# Patient Record
Sex: MALE | Race: WHITE | NOT HISPANIC OR LATINO | Employment: FULL TIME | URBAN - METROPOLITAN AREA
[De-identification: names, ages, dates, MRNs, and addresses within clinical notes are randomized per-mention and may not be internally consistent; named-entity substitution may affect disease eponyms.]

---

## 2023-04-26 DIAGNOSIS — Z86.59 HISTORY OF OCD (OBSESSIVE COMPULSIVE DISORDER): Primary | ICD-10-CM

## 2023-04-26 RX ORDER — FLUOXETINE HYDROCHLORIDE 40 MG/1
40 CAPSULE ORAL DAILY
COMMUNITY
End: 2023-04-26 | Stop reason: SDUPTHER

## 2023-04-26 RX ORDER — FLUOXETINE HYDROCHLORIDE 40 MG/1
CAPSULE ORAL
Qty: 90 CAPSULE | Refills: 0 | OUTPATIENT
Start: 2023-04-26

## 2023-04-26 RX ORDER — FLUOXETINE HYDROCHLORIDE 40 MG/1
40 CAPSULE ORAL DAILY
Qty: 90 CAPSULE | Refills: 1 | Status: SHIPPED | OUTPATIENT
Start: 2023-04-26 | End: 2024-01-11

## 2023-05-22 ENCOUNTER — OFFICE VISIT (OUTPATIENT)
Dept: PRIMARY CARE | Facility: CLINIC | Age: 38
End: 2023-05-22
Payer: COMMERCIAL

## 2023-05-22 VITALS
HEIGHT: 70 IN | SYSTOLIC BLOOD PRESSURE: 128 MMHG | TEMPERATURE: 97.3 F | RESPIRATION RATE: 18 BRPM | BODY MASS INDEX: 35.07 KG/M2 | DIASTOLIC BLOOD PRESSURE: 76 MMHG | WEIGHT: 245 LBS | OXYGEN SATURATION: 98 % | HEART RATE: 68 BPM

## 2023-05-22 DIAGNOSIS — Z86.59 HISTORY OF OCD (OBSESSIVE COMPULSIVE DISORDER): Primary | ICD-10-CM

## 2023-05-22 DIAGNOSIS — R06.83 HABITUAL SNORING: ICD-10-CM

## 2023-05-22 DIAGNOSIS — G47.19 EXCESSIVE DAYTIME SLEEPINESS: ICD-10-CM

## 2023-05-22 DIAGNOSIS — E66.09 CLASS 2 OBESITY DUE TO EXCESS CALORIES WITHOUT SERIOUS COMORBIDITY WITH BODY MASS INDEX (BMI) OF 35.0 TO 35.9 IN ADULT: ICD-10-CM

## 2023-05-22 DIAGNOSIS — R29.818 SUSPECTED SLEEP APNEA: ICD-10-CM

## 2023-05-22 PROBLEM — E66.812 CLASS 2 OBESITY DUE TO EXCESS CALORIES WITHOUT SERIOUS COMORBIDITY WITH BODY MASS INDEX (BMI) OF 35.0 TO 35.9 IN ADULT: Status: ACTIVE | Noted: 2023-05-22

## 2023-05-22 PROCEDURE — 99214 OFFICE O/P EST MOD 30 MIN: CPT | Performed by: FAMILY MEDICINE

## 2023-05-22 PROCEDURE — 1036F TOBACCO NON-USER: CPT | Performed by: FAMILY MEDICINE

## 2023-05-22 PROCEDURE — 3008F BODY MASS INDEX DOCD: CPT | Performed by: FAMILY MEDICINE

## 2023-05-22 ASSESSMENT — ENCOUNTER SYMPTOMS
SHORTNESS OF BREATH: 0
HEADACHES: 0

## 2023-05-22 NOTE — PROGRESS NOTES
"Subjective     Erasto Bhagat is a 37 y.o. male who presents for Med Refill (Pt. In for medication follow up.).    HPI     Hx of OCD, controlled on prozac daily.      He also has gained weight since last visit.      He also is concerned for GABRIELLE due to his loud snoring and gasping/choking at night, apnea spells, daytime sleepiness.  He is requesting sleep study.      Review of Systems   Respiratory:  Negative for shortness of breath.    Cardiovascular:  Negative for chest pain.   Neurological:  Negative for headaches.       Objective     Vitals:    05/22/23 1310 05/22/23 1339   BP: 128/76    BP Location: Right arm    Patient Position: Sitting    Pulse: 68    Resp: 18    Temp: 36.3 °C (97.3 °F)    TempSrc: Temporal    SpO2: 98%    Weight: 111 kg (245 lb)    Height:  1.778 m (5' 10\")        Current Outpatient Medications   Medication Instructions    FLUoxetine (PROZAC) 40 mg, oral, Daily        Past Surgical History:   Procedure Laterality Date    OTHER SURGICAL HISTORY  01/13/2022    Appendectomy        Social History     Tobacco Use    Smoking status: Never    Smokeless tobacco: Never   Vaping Use    Vaping status: Never Used        No family history on file.     Immunization History   Administered Date(s) Administered    Pfizer Purple Cap SARS-CoV-2 11/26/2021        Physical Exam  Vitals reviewed.   Constitutional:       General: He is not in acute distress.     Appearance: Normal appearance. He is obese. He is not ill-appearing.   HENT:      Head: Normocephalic and atraumatic.      Right Ear: Tympanic membrane, ear canal and external ear normal.      Left Ear: Tympanic membrane, ear canal and external ear normal.      Nose: Nose normal.      Mouth/Throat:      Mouth: Mucous membranes are moist.      Pharynx: No oropharyngeal exudate or posterior oropharyngeal erythema.      Comments: Congested posterior pharynx   Eyes:      Conjunctiva/sclera: Conjunctivae normal.   Neck:      Thyroid: No thyroid mass or " thyromegaly.   Cardiovascular:      Rate and Rhythm: Normal rate and regular rhythm.      Heart sounds: No murmur heard.  Pulmonary:      Effort: No respiratory distress.      Breath sounds: Normal breath sounds. No wheezing, rhonchi or rales.   Abdominal:      General: Abdomen is flat. There is no distension.      Palpations: Abdomen is soft. There is no mass.      Tenderness: There is no abdominal tenderness.   Musculoskeletal:         General: Normal range of motion.   Lymphadenopathy:      Cervical: No cervical adenopathy.   Skin:     General: Skin is warm and dry.      Findings: No lesion or rash.   Neurological:      Mental Status: He is alert and oriented to person, place, and time. Mental status is at baseline.   Psychiatric:         Mood and Affect: Mood normal.         Behavior: Behavior normal.         Problem List Items Addressed This Visit       History of OCD (obsessive compulsive disorder) - Primary    Relevant Orders    Comprehensive Metabolic Panel    Lipid Panel    CBC and Auto Differential    Class 2 obesity due to excess calories without serious comorbidity with body mass index (BMI) of 35.0 to 35.9 in adult    Relevant Orders    Hemoglobin A1C    TSH with reflex to Free T4 if abnormal    Home sleep apnea test (HSAT)     Other Visit Diagnoses       Suspected sleep apnea        Relevant Orders    Home sleep apnea test (HSAT)    Habitual snoring        Relevant Orders    Home sleep apnea test (HSAT)    Excessive daytime sleepiness        Relevant Orders    Home sleep apnea test (HSAT)            Assessment/Plan     Suspected GABRIELLE - sleep study ordered    Obesity - Healthy diet, routine exercise was discussed with patient  , declined nutrition referral.  Will consider GLP 1 in future depending on insurance coverage.    OCD - on prozac, controlled well    Complete labs     Follow up after sleep study or in 1-2 months

## 2023-06-11 ENCOUNTER — TELEPHONE (OUTPATIENT)
Dept: PRIMARY CARE | Facility: CLINIC | Age: 38
End: 2023-06-11
Payer: COMMERCIAL

## 2023-06-11 DIAGNOSIS — G47.33 OBSTRUCTIVE SLEEP APNEA: Primary | ICD-10-CM

## 2023-06-14 DIAGNOSIS — R29.818 SUSPECTED SLEEP APNEA: ICD-10-CM

## 2023-06-14 DIAGNOSIS — E66.09 CLASS 2 OBESITY DUE TO EXCESS CALORIES WITHOUT SERIOUS COMORBIDITY WITH BODY MASS INDEX (BMI) OF 35.0 TO 35.9 IN ADULT: ICD-10-CM

## 2023-06-14 DIAGNOSIS — R06.83 HABITUAL SNORING: ICD-10-CM

## 2023-06-14 DIAGNOSIS — G47.19 EXCESSIVE DAYTIME SLEEPINESS: ICD-10-CM

## 2023-08-09 ENCOUNTER — OFFICE VISIT (OUTPATIENT)
Dept: PRIMARY CARE | Facility: CLINIC | Age: 38
End: 2023-08-09
Payer: COMMERCIAL

## 2023-08-09 VITALS
TEMPERATURE: 98.1 F | DIASTOLIC BLOOD PRESSURE: 92 MMHG | OXYGEN SATURATION: 97 % | BODY MASS INDEX: 35.58 KG/M2 | WEIGHT: 248 LBS | RESPIRATION RATE: 16 BRPM | SYSTOLIC BLOOD PRESSURE: 138 MMHG | HEART RATE: 85 BPM

## 2023-08-09 DIAGNOSIS — H66.001 NON-RECURRENT ACUTE SUPPURATIVE OTITIS MEDIA OF RIGHT EAR WITHOUT SPONTANEOUS RUPTURE OF TYMPANIC MEMBRANE: Primary | ICD-10-CM

## 2023-08-09 PROCEDURE — 99213 OFFICE O/P EST LOW 20 MIN: CPT | Performed by: FAMILY MEDICINE

## 2023-08-09 PROCEDURE — 3008F BODY MASS INDEX DOCD: CPT | Performed by: FAMILY MEDICINE

## 2023-08-09 PROCEDURE — 1036F TOBACCO NON-USER: CPT | Performed by: FAMILY MEDICINE

## 2023-08-09 RX ORDER — OFLOXACIN 3 MG/ML
10 SOLUTION AURICULAR (OTIC) DAILY
Qty: 5 ML | Refills: 0 | Status: SHIPPED | OUTPATIENT
Start: 2023-08-09 | End: 2023-08-16

## 2023-08-09 RX ORDER — AMOXICILLIN AND CLAVULANATE POTASSIUM 875; 125 MG/1; MG/1
1 TABLET, FILM COATED ORAL 2 TIMES DAILY
Qty: 20 TABLET | Refills: 0 | Status: SHIPPED | OUTPATIENT
Start: 2023-08-09 | End: 2023-08-19

## 2023-08-09 NOTE — PROGRESS NOTES
Subjective     Erasto Bhagat is a 38 y.o. male who presents for Earache (Ear drainage on the right side with decreased hearing. Sinus pressure/pain onset one week ago. Sore throat has resolved. ).    Earache   Associated symptoms include ear discharge.        Pt reports right ear pain, ear drainage, with decreased hearing. Hx of ear tubes as child. No hx of perforated TM however pt is concerned he may have ruptured his right TM a few days ago. No fevers or chills.  He has sinus congestion.     Review of Systems   HENT:  Positive for ear discharge and ear pain.        Objective     Vitals:    08/09/23 1518   BP: (!) 138/92   Pulse: 85   Resp: 16   Temp: 36.7 °C (98.1 °F)   SpO2: 97%   Weight: 112 kg (248 lb)        Current Outpatient Medications   Medication Instructions    amoxicillin-pot clavulanate (Augmentin) 875-125 mg tablet 875 mg, oral, 2 times daily    FLUoxetine (PROZAC) 40 mg, oral, Daily    ofloxacin (Floxin) 0.3 % otic solution 10 drops, Right Ear, Daily        Past Surgical History:   Procedure Laterality Date    OTHER SURGICAL HISTORY  01/13/2022    Appendectomy        Social History     Tobacco Use    Smoking status: Never    Smokeless tobacco: Never   Vaping Use    Vaping Use: Never used        No family history on file.     Immunization History   Administered Date(s) Administered    Pfizer Purple Cap SARS-CoV-2 11/26/2021        Physical Exam  Constitutional:       General: He is not in acute distress.     Appearance: He is not toxic-appearing.   HENT:      Head: Normocephalic and atraumatic.      Right Ear: External ear normal. Drainage present. No swelling or tenderness. Tympanic membrane is not perforated.      Left Ear: Tympanic membrane, ear canal and external ear normal. Tympanic membrane is not perforated.      Ears:      Comments: Right TM dull, purulant effusion noted     Nose: No congestion or rhinorrhea.      Mouth/Throat:      Mouth: Mucous membranes are moist.      Pharynx:  Oropharynx is clear. No oropharyngeal exudate or posterior oropharyngeal erythema.   Eyes:      Conjunctiva/sclera: Conjunctivae normal.   Cardiovascular:      Rate and Rhythm: Normal rate and regular rhythm.   Pulmonary:      Effort: Pulmonary effort is normal. No respiratory distress.      Breath sounds: Normal breath sounds. No wheezing, rhonchi or rales.   Lymphadenopathy:      Cervical: No cervical adenopathy.   Skin:     General: Skin is warm and dry.      Findings: No rash.   Neurological:      Mental Status: He is alert and oriented to person, place, and time. Mental status is at baseline.   Psychiatric:         Mood and Affect: Mood normal.         Behavior: Behavior normal.         Problem List Items Addressed This Visit    None  Visit Diagnoses       Non-recurrent acute suppurative otitis media of right ear without spontaneous rupture of tympanic membrane    -  Primary    Relevant Medications    amoxicillin-pot clavulanate (Augmentin) 875-125 mg tablet    ofloxacin (Floxin) 0.3 % otic solution            Assessment/Plan     AOM - right ear - with possible mild otitis externa - augmentin and ofloxacin drops prescribed, Follow up if no improvement or if symptoms worsen.

## 2024-01-05 DIAGNOSIS — Z86.59 HISTORY OF OCD (OBSESSIVE COMPULSIVE DISORDER): ICD-10-CM

## 2024-01-11 RX ORDER — FLUOXETINE HYDROCHLORIDE 40 MG/1
40 CAPSULE ORAL DAILY
Qty: 90 CAPSULE | Refills: 3 | Status: SHIPPED | OUTPATIENT
Start: 2024-01-11

## 2024-05-08 ENCOUNTER — APPOINTMENT (OUTPATIENT)
Dept: SLEEP MEDICINE | Facility: HOSPITAL | Age: 39
End: 2024-05-08
Payer: COMMERCIAL

## 2024-05-09 ENCOUNTER — APPOINTMENT (OUTPATIENT)
Dept: SLEEP MEDICINE | Facility: HOSPITAL | Age: 39
End: 2024-05-09
Payer: COMMERCIAL

## 2024-05-13 ENCOUNTER — OFFICE VISIT (OUTPATIENT)
Dept: SLEEP MEDICINE | Facility: CLINIC | Age: 39
End: 2024-05-13
Payer: COMMERCIAL

## 2024-05-13 DIAGNOSIS — E66.09 CLASS 2 OBESITY DUE TO EXCESS CALORIES WITHOUT SERIOUS COMORBIDITY WITH BODY MASS INDEX (BMI) OF 35.0 TO 35.9 IN ADULT: ICD-10-CM

## 2024-05-13 DIAGNOSIS — G47.33 OBSTRUCTIVE SLEEP APNEA SYNDROME, SEVERE: Primary | ICD-10-CM

## 2024-05-13 PROCEDURE — 3008F BODY MASS INDEX DOCD: CPT | Performed by: PSYCHIATRY & NEUROLOGY

## 2024-05-13 PROCEDURE — 99204 OFFICE O/P NEW MOD 45 MIN: CPT | Performed by: PSYCHIATRY & NEUROLOGY

## 2024-05-13 PROCEDURE — 1036F TOBACCO NON-USER: CPT | Performed by: PSYCHIATRY & NEUROLOGY

## 2024-05-13 ASSESSMENT — PATIENT HEALTH QUESTIONNAIRE - PHQ9
2. FEELING DOWN, DEPRESSED OR HOPELESS: NOT AT ALL
SUM OF ALL RESPONSES TO PHQ9 QUESTIONS 1 AND 2: 0
1. LITTLE INTEREST OR PLEASURE IN DOING THINGS: NOT AT ALL

## 2024-05-13 NOTE — PROGRESS NOTES
" Patient: Erasto Bhagat    [id number]  : 1985 -- AGE 38 y.o.    Provider: Ricki Mejia MD     [facility]   Service Date: 2024              [facility]  New Visit Note      Virtual or Telephone Consent  An interactive audio and video telecommunication system which permits real time communications between the patient (at the originating site) and provider (at the distant site) was utilized to provide this telehealth service.   Verbal consent was requested and obtained from Erasto Bhagat on this date, 24 for a telehealth visit.       The patient's referring provider is: No ref. 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      Per record review, he initially presented to his PCP on 2023 with concern for GABRIELLE due to loud snoring, gasping/choking at night, apneic spells, and daytime sleepiness.  Home sleep study was performed and showed very severe GABRIELLE, see details below.    Today, he states that using CPAP is going \"great\". Using it nightly. No longer has drowsy driving. Has more daytime energy. No snoring with CPAP, though it was a problem before. The snoring started in childhood, is worse when supine, can be loud, and got worse over the years with weight gain.    He is getting his supplies renewed appropriately. Was told he needs a visit with us to avoid getting a bill to fully pay the cost of his CPAP.    CPAP device: AirSense 11 auto CPAP, set up 2023  Mask: airfit P10 nasal pillow 2024  Mask fit: good    NIGHTTIME SYMPTOMS:   Snoring: none with CPAP  Witnessed apnea: never  Nocturnal gasping: never  Nocturnal choking: never  Sleep walking: No  Sleep talking:  No  Dream enactment: No  Morning headaches: No  Morning dry mouth/sore throat: No  Nocturia: prior to CPAP, now infrequent  Sleep " paralysis: No  Hypnagogic/hypnopompic hallucinations: No    DAYTIME SYMPTOMS  Daytime sleepiness: generally no  Fatigue: generally no  Trouble with memory/concentration: No  Feeling sleepy while driving: Denies    RLS symptoms: No     SLEEP HABITS:   Preferred sleep position: lateral  Bedtime: 10 pm, sleep latency within 10-20 minutes  Wake time: 6-7 am  # of nocturnal awakenings: 1-2x/night, short  Napping: sometimes when he can on the weekend, napping is refreshing. Napping is variably refreshing  Total estimated sleep per 24 hrs: 8 hours    PRIOR SLEEP STUDIES:  HST 5/29/2023: Weight 108.9 kg, BMI 34.44.  Study showed very severe GABRIELLE with AHI 3% 95/h with no positional component.  Respiratory events consisted of 605 obstructive apneas, 3 central apneas, and 53 hypopneas.  Baseline SpO2 93.4%, mean 89.6%, romeo 48.4% with 133.8 minutes spent at or below 88% (32% of testing time).  -report and hypnogram reviewed personally by me today.    PRIOR TREATMENTS:  No stimulants or sleep aids.    Patient Active Problem List   Diagnosis    History of OCD (obsessive compulsive disorder)    Class 2 obesity due to excess calories without serious comorbidity with body mass index (BMI) of 35.0 to 35.9 in adult     No past medical history on file.  Past Surgical History:   Procedure Laterality Date    OTHER SURGICAL HISTORY  01/13/2022    Appendectomy     Current Outpatient Medications   Medication Sig Dispense Refill    amoxicillin-pot clavulanate (Augmentin) 875-125 mg tablet TAKE 1 TABLET (875 MG) BY MOUTH 2 TIMES A DAY FOR 10 DAYS. 20 tablet 0    FLUoxetine (PROzac) 40 mg capsule Take 1 capsule by mouth once daily 90 capsule 3    ofloxacin (Floxin) 0.3 % otic solution ADMINISTER 10 DROPS INTO THE RIGHT EAR ONCE DAILY FOR 7 DAYS. 5 mL 0     No current facility-administered medications for this visit.     No Known Allergies    FAMILY HISTORY OF SLEEP DISORDERS: none that he knows of.  Family History   Problem Relation Name Age  "of Onset    Crohn's disease Sister      Hypothyroidism Maternal Grandmother         SOCIAL HISTORY  Employment: physician - maternal fetal medicine  Lives with: wife and 5 year old son  Alcohol: 3 beers twice per week - makes him sleepy and fragments his sleep  Cigarettes: never  Illicits: no  Caffeine: 2-3 cups of coffee/day.     ROS: 12 point ROS is negative for all items/systems    PHYSICAL EXAMINATION:   General: Awake. Alert. Comfortable. No apparent distress.   Speech: Normal  Comprehension: Normal  Mood: Stable  Affect: Appropriate  Eyes:   Eyelids: normal            ENT:         Unable to assess patency of nasal passageways or for presence of nasal septum deviation. Unable to clearly view posterior oropharynx to assess tonsils, uvula, and Deshpande tongue score.   Tongue scalloping is present, tongue is enlarged, retrognathia is not present. Dentition is very good.           Neck:          Circumference: increased in caliber  Cardiac:  unable to assess pulses or cardiac rate/rhythm.  Pul:          Normal respiratory effort   Abd:         obese  Neuro: Alert, well-oriented. Cranial nerves II-XII grossly normal and symmetric. No abnormal movements noted.       CPAP download:  DME: [facility]  Setup date: 2023  Date range: 4/10/2024-2024  Days used: 100%  Days used >4 hours: 100%  Average usage (days used): 8 h 56 min  Settin-15 cm H2O, EPR 3  Pressure: 95th %ile 14.8 cm H2O, median 12.7 cm H2O, max 14.9 cm H2O  Leak: 5.8 L/min (95th %ile)  AHI: 4.5 - HI 0.7, OAI 3.4, YOLANDA 0.3, UAI 0.1      LABS/DIAGNOSTICS:  No results found for: \"HGB\", \"CO2\", \"TSH\", \"FREET4\", \"HGBA1C\", \"FERRITIN\", \"IRON\", \"TIBC\", \"IRONSAT\", \"VITD25\", \"JQRSCGXR35\"     Echo: none  PFTs: none       ASSESSMENT AND PLAN: Mr. Erasto Bhagat is a 38 y.o. male with a history of severe GABRIELLE and obesity who is doing extremely well on CPAP.     #GABRIELLE  -We discussed the risk factors for sleep apnea, pathophysiology of sleep apnea, treatment " options, and potential long-term complications of untreated GABRIELLE, including cardiovascular and metabolic complications.   -CPAP download looks great, but machine sometimes hits maximum programmed pressure setting, so I will open up the range to 5-18 cm H2O  -I will let his DME know that he had today's visit with me.    #obesity  -diet and exercise recommended for weight loss    All of the above was discussed with the patient in detail. He voiced an understanding of the above and was agreeable to proceed further as advised.       FOLLOW UP:  May 12, 2025 at 8:20 AM via video for CPAP follow up

## 2024-12-28 ASSESSMENT — PROMIS GLOBAL HEALTH SCALE
RATE_AVERAGE_FATIGUE: MODERATE
CARRYOUT_SOCIAL_ACTIVITIES: VERY GOOD
RATE_MENTAL_HEALTH: VERY GOOD
CARRYOUT_PHYSICAL_ACTIVITIES: COMPLETELY
RATE_AVERAGE_PAIN: 0
RATE_QUALITY_OF_LIFE: VERY GOOD
RATE_SOCIAL_SATISFACTION: EXCELLENT
RATE_GENERAL_HEALTH: FAIR
EMOTIONAL_PROBLEMS: RARELY
RATE_PHYSICAL_HEALTH: FAIR

## 2025-01-03 ENCOUNTER — LAB (OUTPATIENT)
Dept: LAB | Facility: LAB | Age: 40
End: 2025-01-03
Payer: COMMERCIAL

## 2025-01-03 ENCOUNTER — APPOINTMENT (OUTPATIENT)
Facility: CLINIC | Age: 40
End: 2025-01-03
Payer: COMMERCIAL

## 2025-01-03 VITALS
TEMPERATURE: 97.5 F | SYSTOLIC BLOOD PRESSURE: 156 MMHG | BODY MASS INDEX: 38.37 KG/M2 | OXYGEN SATURATION: 95 % | WEIGHT: 268 LBS | HEIGHT: 70 IN | RESPIRATION RATE: 18 BRPM | HEART RATE: 75 BPM | DIASTOLIC BLOOD PRESSURE: 96 MMHG

## 2025-01-03 DIAGNOSIS — E66.812 CLASS 2 OBESITY DUE TO EXCESS CALORIES WITHOUT SERIOUS COMORBIDITY WITH BODY MASS INDEX (BMI) OF 38.0 TO 38.9 IN ADULT: ICD-10-CM

## 2025-01-03 DIAGNOSIS — E66.09 CLASS 2 OBESITY DUE TO EXCESS CALORIES WITHOUT SERIOUS COMORBIDITY WITH BODY MASS INDEX (BMI) OF 38.0 TO 38.9 IN ADULT: ICD-10-CM

## 2025-01-03 DIAGNOSIS — G47.33 OBSTRUCTIVE SLEEP APNEA SYNDROME, SEVERE: ICD-10-CM

## 2025-01-03 DIAGNOSIS — Z86.59 HISTORY OF OCD (OBSESSIVE COMPULSIVE DISORDER): ICD-10-CM

## 2025-01-03 DIAGNOSIS — Z23 NEED FOR DIPHTHERIA-TETANUS-PERTUSSIS (TDAP) VACCINE: ICD-10-CM

## 2025-01-03 DIAGNOSIS — I10 HYPERTENSION, ESSENTIAL, BENIGN: ICD-10-CM

## 2025-01-03 DIAGNOSIS — Z00.00 HEALTH CARE MAINTENANCE: Primary | ICD-10-CM

## 2025-01-03 DIAGNOSIS — Z00.00 HEALTH CARE MAINTENANCE: ICD-10-CM

## 2025-01-03 LAB
ALBUMIN SERPL BCP-MCNC: 4.6 G/DL (ref 3.4–5)
ALP SERPL-CCNC: 59 U/L (ref 33–120)
ALT SERPL W P-5'-P-CCNC: 59 U/L (ref 10–52)
ANION GAP SERPL CALC-SCNC: 16 MMOL/L (ref 10–20)
AST SERPL W P-5'-P-CCNC: 32 U/L (ref 9–39)
BASOPHILS # BLD AUTO: 0.06 X10*3/UL (ref 0–0.1)
BASOPHILS NFR BLD AUTO: 0.8 %
BILIRUB SERPL-MCNC: 0.4 MG/DL (ref 0–1.2)
BUN SERPL-MCNC: 14 MG/DL (ref 6–23)
CALCIUM SERPL-MCNC: 9.4 MG/DL (ref 8.6–10.6)
CHLORIDE SERPL-SCNC: 102 MMOL/L (ref 98–107)
CO2 SERPL-SCNC: 26 MMOL/L (ref 21–32)
CREAT SERPL-MCNC: 0.97 MG/DL (ref 0.5–1.3)
EGFRCR SERPLBLD CKD-EPI 2021: >90 ML/MIN/1.73M*2
EOSINOPHIL # BLD AUTO: 0.17 X10*3/UL (ref 0–0.7)
EOSINOPHIL NFR BLD AUTO: 2.4 %
ERYTHROCYTE [DISTWIDTH] IN BLOOD BY AUTOMATED COUNT: 12.4 % (ref 11.5–14.5)
EST. AVERAGE GLUCOSE BLD GHB EST-MCNC: 114 MG/DL
GLUCOSE SERPL-MCNC: 96 MG/DL (ref 74–99)
HBA1C MFR BLD: 5.6 %
HCT VFR BLD AUTO: 40.7 % (ref 41–52)
HGB BLD-MCNC: 13.5 G/DL (ref 13.5–17.5)
IMM GRANULOCYTES # BLD AUTO: 0.02 X10*3/UL (ref 0–0.7)
IMM GRANULOCYTES NFR BLD AUTO: 0.3 % (ref 0–0.9)
LYMPHOCYTES # BLD AUTO: 2.69 X10*3/UL (ref 1.2–4.8)
LYMPHOCYTES NFR BLD AUTO: 37.4 %
MCH RBC QN AUTO: 28.4 PG (ref 26–34)
MCHC RBC AUTO-ENTMCNC: 33.2 G/DL (ref 32–36)
MCV RBC AUTO: 86 FL (ref 80–100)
MONOCYTES # BLD AUTO: 0.53 X10*3/UL (ref 0.1–1)
MONOCYTES NFR BLD AUTO: 7.4 %
NEUTROPHILS # BLD AUTO: 3.73 X10*3/UL (ref 1.2–7.7)
NEUTROPHILS NFR BLD AUTO: 51.7 %
NRBC BLD-RTO: 0 /100 WBCS (ref 0–0)
PLATELET # BLD AUTO: 302 X10*3/UL (ref 150–450)
POC APPEARANCE, URINE: CLEAR
POC BILIRUBIN, URINE: NEGATIVE
POC BLOOD, URINE: NEGATIVE
POC COLOR, URINE: YELLOW
POC GLUCOSE, URINE: NEGATIVE MG/DL
POC KETONES, URINE: NEGATIVE MG/DL
POC LEUKOCYTES, URINE: NEGATIVE
POC NITRITE,URINE: NEGATIVE
POC PH, URINE: 6.5 PH
POC PROTEIN, URINE: NEGATIVE MG/DL
POC SPECIFIC GRAVITY, URINE: 1.01
POC UROBILINOGEN, URINE: 0.2 EU/DL
POTASSIUM SERPL-SCNC: 4.2 MMOL/L (ref 3.5–5.3)
PROT SERPL-MCNC: 7.3 G/DL (ref 6.4–8.2)
RBC # BLD AUTO: 4.76 X10*6/UL (ref 4.5–5.9)
SODIUM SERPL-SCNC: 140 MMOL/L (ref 136–145)
TSH SERPL-ACNC: 2.33 MIU/L (ref 0.44–3.98)
WBC # BLD AUTO: 7.2 X10*3/UL (ref 4.4–11.3)

## 2025-01-03 PROCEDURE — 3080F DIAST BP >= 90 MM HG: CPT | Performed by: FAMILY MEDICINE

## 2025-01-03 PROCEDURE — 99395 PREV VISIT EST AGE 18-39: CPT | Performed by: FAMILY MEDICINE

## 2025-01-03 PROCEDURE — 84443 ASSAY THYROID STIM HORMONE: CPT

## 2025-01-03 PROCEDURE — 3008F BODY MASS INDEX DOCD: CPT | Performed by: FAMILY MEDICINE

## 2025-01-03 PROCEDURE — 3077F SYST BP >= 140 MM HG: CPT | Performed by: FAMILY MEDICINE

## 2025-01-03 PROCEDURE — 81002 URINALYSIS NONAUTO W/O SCOPE: CPT | Performed by: FAMILY MEDICINE

## 2025-01-03 PROCEDURE — 90715 TDAP VACCINE 7 YRS/> IM: CPT | Performed by: FAMILY MEDICINE

## 2025-01-03 PROCEDURE — 90471 IMMUNIZATION ADMIN: CPT | Performed by: FAMILY MEDICINE

## 2025-01-03 PROCEDURE — 1036F TOBACCO NON-USER: CPT | Performed by: FAMILY MEDICINE

## 2025-01-03 PROCEDURE — 80053 COMPREHEN METABOLIC PANEL: CPT

## 2025-01-03 PROCEDURE — 85025 COMPLETE CBC W/AUTO DIFF WBC: CPT

## 2025-01-03 PROCEDURE — 83036 HEMOGLOBIN GLYCOSYLATED A1C: CPT

## 2025-01-03 PROCEDURE — 93000 ELECTROCARDIOGRAM COMPLETE: CPT | Performed by: FAMILY MEDICINE

## 2025-01-03 RX ORDER — LOSARTAN POTASSIUM 50 MG/1
50 TABLET ORAL DAILY
Qty: 90 TABLET | Refills: 1 | Status: SHIPPED | OUTPATIENT
Start: 2025-01-03

## 2025-01-03 RX ORDER — FLUOXETINE HYDROCHLORIDE 40 MG/1
40 CAPSULE ORAL DAILY
Qty: 90 CAPSULE | Refills: 3 | Status: SHIPPED | OUTPATIENT
Start: 2025-01-03

## 2025-01-03 ASSESSMENT — ENCOUNTER SYMPTOMS
SHORTNESS OF BREATH: 0
HEADACHES: 0

## 2025-01-03 ASSESSMENT — PATIENT HEALTH QUESTIONNAIRE - PHQ9
1. LITTLE INTEREST OR PLEASURE IN DOING THINGS: NOT AT ALL
2. FEELING DOWN, DEPRESSED OR HOPELESS: NOT AT ALL
SUM OF ALL RESPONSES TO PHQ9 QUESTIONS 1 AND 2: 0

## 2025-01-03 NOTE — ASSESSMENT & PLAN NOTE
On prozac, controlled  Orders:    FLUoxetine (PROzac) 40 mg capsule; Take 1 capsule (40 mg) by mouth once daily.

## 2025-01-03 NOTE — PROGRESS NOTES
"Subjective     Erasto Bhagat is a 39 y.o. male who presents for Annual Exam.    HPI       Pt is here today for annual well exam.     Pt was diagnosed with severe GABRIELLE in June 2023.  He is on CPAP and sleeping well.  He sees sleep specialist.     Hx of OCD, on prozac 40 mg daily.  He feels his mood is controlled well.  Patient denies any suicidal or homicidal ideation or plan.      Pt has hx of elevated blood pressure readings.  His home bp readings are in the 130-140s systolic.  Patient denies chest pain, shortness of breath, dizziness, headaches or vision changes.     He is interested in starting zepbound to treat Obesity.   Hx of GABRIELLE.      Review of Systems   Respiratory:  Negative for shortness of breath.    Cardiovascular:  Negative for chest pain.   Neurological:  Negative for headaches.       Objective     Vitals:    01/03/25 1023 01/03/25 1120   BP: (!) 158/109 (!) 156/96   BP Location: Left arm    Patient Position: Sitting    Pulse: 75    Resp: 18    Temp: 36.4 °C (97.5 °F)    TempSrc: Temporal    SpO2: 95%    Weight: 122 kg (268 lb)    Height: 1.778 m (5' 10\")         Current Outpatient Medications   Medication Instructions    FLUoxetine (PROZAC) 40 mg, oral, Daily    losartan (COZAAR) 50 mg, oral, Daily        No Known Allergies     Past Surgical History:   Procedure Laterality Date    APPENDECTOMY      WISDOM TOOTH EXTRACTION          Social History     Tobacco Use    Smoking status: Never    Smokeless tobacco: Never   Vaping Use    Vaping status: Never Used   Substance Use Topics    Alcohol use: Yes     Alcohol/week: 2.0 standard drinks of alcohol     Types: 2 Standard drinks or equivalent per week    Drug use: Never        Family History   Problem Relation Name Age of Onset    No Known Problems Father      Crohn's disease Sister      Hypothyroidism Maternal Grandmother      Hypertension Maternal Grandmother          Immunization History   Administered Date(s) Administered    COVID-19, mRNA, LNP-S, " PF, 30 mcg/0.3 mL dose 11/26/2021    Flu vaccine (IIV4), preservative free *Check age/dose* 09/11/2022    Tdap vaccine, age 7 year and older (BOOSTRIX, ADACEL) 01/03/2025        Physical Exam  Vitals reviewed.   Constitutional:       General: He is not in acute distress.     Appearance: Normal appearance. He is obese. He is not ill-appearing.   HENT:      Head: Normocephalic and atraumatic.      Right Ear: Tympanic membrane, ear canal and external ear normal.      Left Ear: Tympanic membrane, ear canal and external ear normal.      Mouth/Throat:      Mouth: Mucous membranes are moist.      Pharynx: No oropharyngeal exudate or posterior oropharyngeal erythema.   Neck:      Thyroid: No thyroid mass or thyromegaly.   Cardiovascular:      Rate and Rhythm: Normal rate and regular rhythm.      Heart sounds: No murmur heard.  Pulmonary:      Effort: No respiratory distress.      Breath sounds: Normal breath sounds. No wheezing, rhonchi or rales.   Abdominal:      General: There is no distension.      Palpations: Abdomen is soft.      Tenderness: There is no abdominal tenderness.   Genitourinary:     Comments: Declined testicular exam  Lymphadenopathy:      Cervical: No cervical adenopathy.   Skin:     General: Skin is warm and dry.      Findings: No rash.   Neurological:      Mental Status: He is alert and oriented to person, place, and time. Mental status is at baseline.   Psychiatric:         Mood and Affect: Mood normal.         Behavior: Behavior normal.         Assessment & Plan  Health care maintenance  Well Exam     Vaccines - tdap - given today     Flu vaccine - utd     I recommend yearly flu vaccine and routine COVID vaccinations as indicated     Complete labs    Healthy diet, routine exercise was discussed with patient      Orders:    POCT UA (nonautomated) manually resulted    ECG 12 lead (Clinic Performed)    Comprehensive Metabolic Panel; Future    Lipid Panel; Future    CBC and Auto Differential; Future     Hemoglobin A1C; Future    Class 2 obesity due to excess calories without serious comorbidity with body mass index (BMI) of 38.0 to 38.9 in adult  See clinical medicine to discuss to [facility]   Orders:    Hemoglobin A1C; Future    TSH with reflex to Free T4 if abnormal; Future    Referral to Clinical Pharmacy; Future    History of OCD (obsessive compulsive disorder)  On prozac, controlled  Orders:    FLUoxetine (PROzac) 40 mg capsule; Take 1 capsule (40 mg) by mouth once daily.    Obstructive sleep apnea syndrome, severe  On CPAP, sees sleep medicine   Orders:    Referral to Clinical Pharmacy; Future    Hypertension, essential, benign  Newly diagnosed, start losartan 50 mg daily, The goals of therapy, medication dose, frequency and potential side effects were discussed with patient today.  The patient is agreeable to taking the medication as prescribed.       Patient was instructed to record blood pressures (using an arm BP monitor) at home 1-2 times per day (per AHA guidelines) and to follow up in office for a blood pressure recheck in 4-6 weeks.  I also encouraged low-sodium diet and regular exercise.  I also discussed with patient the importance of good blood pressure control to avoid long-term complications such as heart attack and stroke.      Orders:    TSH with reflex to Free T4 if abnormal; Future    losartan (Cozaar) 50 mg tablet; Take 1 tablet (50 mg) by mouth once daily.    Need for diphtheria-tetanus-pertussis (Tdap) vaccine    Orders:    Tdap vaccine, age 7 years and older

## 2025-01-03 NOTE — ASSESSMENT & PLAN NOTE
Newly diagnosed, start losartan 50 mg daily, The goals of therapy, medication dose, frequency and potential side effects were discussed with patient today.  The patient is agreeable to taking the medication as prescribed.       Patient was instructed to record blood pressures (using an arm BP monitor) at home 1-2 times per day (per AHA guidelines) and to follow up in office for a blood pressure recheck in 4-6 weeks.  I also encouraged low-sodium diet and regular exercise.  I also discussed with patient the importance of good blood pressure control to avoid long-term complications such as heart attack and stroke.      Orders:    TSH with reflex to Free T4 if abnormal; Future    losartan (Cozaar) 50 mg tablet; Take 1 tablet (50 mg) by mouth once daily.     Purse String (Intermediate) Text: Given the location of the defect and the characteristics of the surrounding skin a purse string intermediate closure was deemed most appropriate.  Undermining was performed circumfirentially around the surgical defect.  A purse string suture was then placed and tightened.

## 2025-01-03 NOTE — ASSESSMENT & PLAN NOTE
See clinical medicine to discuss to GLP1   Orders:    Hemoglobin A1C; Future    TSH with reflex to Free T4 if abnormal; Future    Referral to Clinical Pharmacy; Future

## 2025-01-05 DIAGNOSIS — I10 HYPERTENSION, ESSENTIAL, BENIGN: Primary | ICD-10-CM

## 2025-01-05 DIAGNOSIS — R74.01 ELEVATED ALT MEASUREMENT: ICD-10-CM

## 2025-01-10 ENCOUNTER — TELEMEDICINE (OUTPATIENT)
Dept: PHARMACY | Facility: HOSPITAL | Age: 40
End: 2025-01-10
Payer: COMMERCIAL

## 2025-01-10 DIAGNOSIS — E66.09 CLASS 2 OBESITY DUE TO EXCESS CALORIES WITHOUT SERIOUS COMORBIDITY WITH BODY MASS INDEX (BMI) OF 38.0 TO 38.9 IN ADULT: Primary | ICD-10-CM

## 2025-01-10 DIAGNOSIS — G47.33 OBSTRUCTIVE SLEEP APNEA SYNDROME, SEVERE: ICD-10-CM

## 2025-01-10 DIAGNOSIS — E66.812 CLASS 2 OBESITY DUE TO EXCESS CALORIES WITHOUT SERIOUS COMORBIDITY WITH BODY MASS INDEX (BMI) OF 38.0 TO 38.9 IN ADULT: Primary | ICD-10-CM

## 2025-01-10 RX ORDER — TIRZEPATIDE 2.5 MG/.5ML
2.5 INJECTION, SOLUTION SUBCUTANEOUS
Qty: 2 ML | Refills: 1 | Status: SHIPPED | OUTPATIENT
Start: 2025-01-10

## 2025-01-10 NOTE — PROGRESS NOTES
[facility] Pharmacist Visit - Weight Management  Erasto Bhagat is a 39 y.o. male was referred to Clinical Pharmacy Team for Obesity.    Referring Provider: Dejuan Bishop DO  PCP: Dejuan Bishop DO - last visit: 1/3/25, next visit:     Subjective   HPI  PMH significant for HTN.  Special needs/barriers to therapy: none    WEIGHT MANAGEMENT  BMI Readings from Last 1 Encounters:   01/03/25 38.45 kg/m²   Starting weight: 268 lbs (122 kg)  Current weight: 268 lbs    Lab Results   Component Value Date    HGBA1C 5.6 01/03/2025    GLUCOSE 96 01/03/2025   Hyperglycemia: Denies signs and symptoms    Lifestyle  Diet: 3 meals/day.   BK: Egg or two  LN: [place] to eating out  DN: Pizza or ordering out  Snacks: Sometimes at night  Drinks: Coffee morning and afternoon. Diet pepsi occasionally  Has worked with nutritionist/dietician? No    Physical Activity: Rarely, walking only    Other Potential Contributing Factors  Alcohol use: Average 3-4 drinks/week  Medications that may cause weight gain:  Fluoxetine  Mental health: Following with behavioral health specialist  Comorbidities: hypertension and sleep apnea/hypopnea    Non-pharmacological therapy  Weight loss techniques attempted:  Commercial weight loss program: Slim Fast    Pharmacological therapy  Current Medications: none  Previous Medications: none     Adherence: Takes medication as directed and reports no missed doses  Adverse Effects: none    Insurance coverage of weight-loss medications? No, Plan Exclusion  Eligible for copay cards/programs? Yes, Commercial    Medication Patient Risks/Cautions Notes   Wegovy (semaglutide) None    Zepbound (tirzepatide) None    Saxenda (liraglutide) None Current backorder of starting doses   Qsymia (phentermine-topiramate) None Controlled substance   Contrave (bupropion-naltrexone) Uncontrolled HTN    Adipex (phentermine) None Controlled substance,  Short-term use only   Samy/Xenical (orlistat)  Adverse effects likely outweigh  "benefit.     Denies hx of pancreatitis, gastroparesis, thyroid cancer, or Gastric bypass surgery  Denies hx of Seizures or chronic opioid use    Goal: < 200lbs    Secondary Prevention  ASCVD Risk:   The ASCVD Risk score (Chris KNAPP, et al., 2019) failed to calculate for the following reasons:    The 2019 ASCVD risk score is only valid for ages 40 to 79  On Statin?: No,      Immunizations Needed: All up-to-date and documented  Tobacco Use: non-smoker    Objective   Vitals  BP Readings from Last 2 Encounters:   01/03/25 (!) 156/96   08/09/23 (!) 138/92     BMI Readings from Last 1 Encounters:   01/03/25 38.45 kg/m²      Labs  A1C  Lab Results   Component Value Date    HGBA1C 5.6 01/03/2025     BMP  Lab Results   Component Value Date    CALCIUM 9.4 01/03/2025     01/03/2025    K 4.2 01/03/2025    CO2 26 01/03/2025     01/03/2025    BUN 14 01/03/2025    CREATININE 0.97 01/03/2025    EGFR >90 01/03/2025     LFTs  Lab Results   Component Value Date    ALT 59 (H) 01/03/2025    AST 32 01/03/2025    ALKPHOS 59 01/03/2025    BILITOT 0.4 01/03/2025     FLP  No results found for: \"TRIG\", \"CHOL\", \"LDLF\", \"LDLCALC\", \"HDL\"    Assessment/Plan   Problem List Items Addressed This Visit    None      Weight Management: Current regimen includes none. Patient's current weight reported as 268 lbs.  START Zepbound 2.5mg solutions inject 2.5mg subcutaneous once weekly  Prescription for the following were sent to patient's preferred  Pharmacy:  Zepbound 2.5mg solutions inject 2.5mg subcutaneous once weekly  Educated and discuss with patient the benefits and risk of starting GLP-1 for weight loss/DM  Educated patient that to maximize weight loss and prevent return weight gain, patient has to combine GLP-1 with good diet and exercise  DASH Diet  150 mins per week of exercise  Educated patient on the side effects of GLP-1: N/V, stomach upset, diarrhea, stomach pain, etc. Patient instructed to call should there be any questions or " concerns.  FUV with pharmacy in 4 weeks to assess tolerance and titration  Continue to focus on lifestyle modifications as discussed.    Patient Education:  Counseled patient on relevant medication mechanisms of action, expectations, side effects, duration of therapy, contraindications, administration, and monitoring parameters.  All questions and concerns addressed. Contact pharmacist with any further questions or concerns prior to next appointment.  Reviewed dietary recommendations: Healthy Plate method    Clinical Pharmacist follow-up: 2/7, Telehealth visit    Maxime Archuleta PharmD  Clinical Pharmacist  01/10/25    Continue all meds under the continuation of care with the referring provider and clinical pharmacy team.  Verbal consent to manage patient's drug therapy was obtained from the patient. They were informed they may decline to participate or withdraw from participation in pharmacy services at any time.

## 2025-02-06 ENCOUNTER — CLINICAL SUPPORT (OUTPATIENT)
Dept: MATERNAL FETAL MEDICINE | Facility: CLINIC | Age: 40
End: 2025-02-06
Payer: COMMERCIAL

## 2025-02-06 DIAGNOSIS — R74.01 ELEVATED ALT MEASUREMENT: ICD-10-CM

## 2025-02-06 DIAGNOSIS — I10 HYPERTENSION, ESSENTIAL, BENIGN: ICD-10-CM

## 2025-02-06 PROCEDURE — 36415 COLL VENOUS BLD VENIPUNCTURE: CPT

## 2025-02-06 PROCEDURE — 80053 COMPREHEN METABOLIC PANEL: CPT

## 2025-02-07 ENCOUNTER — APPOINTMENT (OUTPATIENT)
Dept: PHARMACY | Facility: HOSPITAL | Age: 40
End: 2025-02-07
Payer: COMMERCIAL

## 2025-02-07 DIAGNOSIS — E66.09 CLASS 2 OBESITY DUE TO EXCESS CALORIES WITHOUT SERIOUS COMORBIDITY WITH BODY MASS INDEX (BMI) OF 38.0 TO 38.9 IN ADULT: Primary | ICD-10-CM

## 2025-02-07 DIAGNOSIS — E66.812 CLASS 2 OBESITY DUE TO EXCESS CALORIES WITHOUT SERIOUS COMORBIDITY WITH BODY MASS INDEX (BMI) OF 38.0 TO 38.9 IN ADULT: Primary | ICD-10-CM

## 2025-02-07 LAB
ALBUMIN SERPL BCP-MCNC: 4.8 G/DL (ref 3.4–5)
ALP SERPL-CCNC: 53 U/L (ref 33–120)
ALT SERPL W P-5'-P-CCNC: 45 U/L (ref 10–52)
ANION GAP SERPL CALC-SCNC: 14 MMOL/L (ref 10–20)
AST SERPL W P-5'-P-CCNC: 29 U/L (ref 9–39)
BILIRUB SERPL-MCNC: 0.4 MG/DL (ref 0–1.2)
BUN SERPL-MCNC: 12 MG/DL (ref 6–23)
CALCIUM SERPL-MCNC: 9.8 MG/DL (ref 8.6–10.6)
CHLORIDE SERPL-SCNC: 100 MMOL/L (ref 98–107)
CO2 SERPL-SCNC: 29 MMOL/L (ref 21–32)
CREAT SERPL-MCNC: 1.18 MG/DL (ref 0.5–1.3)
EGFRCR SERPLBLD CKD-EPI 2021: 80 ML/MIN/1.73M*2
GLUCOSE SERPL-MCNC: 93 MG/DL (ref 74–99)
POTASSIUM SERPL-SCNC: 4.4 MMOL/L (ref 3.5–5.3)
PROT SERPL-MCNC: 7.6 G/DL (ref 6.4–8.2)
SODIUM SERPL-SCNC: 139 MMOL/L (ref 136–145)

## 2025-02-07 RX ORDER — TIRZEPATIDE 5 MG/.5ML
5 INJECTION, SOLUTION SUBCUTANEOUS
Qty: 2 ML | Refills: 2 | Status: SHIPPED | OUTPATIENT
Start: 2025-02-07

## 2025-02-07 NOTE — PROGRESS NOTES
[facility] Pharmacist Visit - Weight Management  Erasto Bhagat is a 39 y.o. male was referred to Clinical Pharmacy Team for Obesity.    Referring Provider: Dejuan Bishop DO  PCP: Dejuan Bishop DO - last visit: 1/14/25, next visit:     Subjective   HPI  PMH significant for HTN.  Special needs/barriers to therapy: none    WEIGHT MANAGEMENT  BMI Readings from Last 1 Encounters:   01/03/25 38.45 kg/m²   Starting weight: 268 lbs (122 kg)  Current weight: 233 lbs    Lab Results   Component Value Date    HGBA1C 5.6 01/03/2025    GLUCOSE 93 02/06/2025   Hyperglycemia: Denies signs and symptoms    Denies n/v, stomach upset, or diarrhea/constipation.    Non-pharmacological therapy  Weight loss techniques attempted:  Commercial weight loss program: Slim Fast     Pharmacological therapy  Current Medications: Zepbound 2.5mg  Previous Medications: none      Adherence: Takes medication as directed and reports no missed doses  Adverse Effects: none     Insurance coverage of weight-loss medications? No, Plan Exclusion  Eligible for copay cards/programs? Yes, Commercial    Medication Patient Risks/Cautions Notes   Wegovy (semaglutide) None    Zepbound (tirzepatide) None    Saxenda (liraglutide) None Current backorder of starting doses   Qsymia (phentermine-topiramate) None Controlled substance   Contrave (bupropion-naltrexone) None    Adipex (phentermine) None Controlled substance,  Short-term use only   Samy/Xenical (orlistat)  Adverse effects likely outweigh benefit.         Secondary Prevention  ASCVD Risk:   The ASCVD Risk score (Chris DK, et al., 2019) failed to calculate for the following reasons:    The 2019 ASCVD risk score is only valid for ages 40 to 79      Objective   Vitals  BP Readings from Last 2 Encounters:   01/03/25 (!) 156/96   08/09/23 (!) 138/92     BMI Readings from Last 1 Encounters:   01/03/25 38.45 kg/m²      Labs  A1C  Lab Results   Component Value Date    HGBA1C 5.6 01/03/2025     BMP  Lab Results  "  Component Value Date    CALCIUM 9.8 02/06/2025     02/06/2025    K 4.4 02/06/2025    CO2 29 02/06/2025     02/06/2025    BUN 12 02/06/2025    CREATININE 1.18 02/06/2025    EGFR 80 02/06/2025     LFTs  Lab Results   Component Value Date    ALT 45 02/06/2025    AST 29 02/06/2025    ALKPHOS 53 02/06/2025    BILITOT 0.4 02/06/2025     FLP  No results found for: \"TRIG\", \"CHOL\", \"LDLF\", \"LDLCALC\", \"HDL\"    Assessment/Plan   Problem List Items Addressed This Visit    None      Weight Management: Current regimen includes Zepbound 2.5mg subcutaneous. Patient's current weight reported as 233 lbs. Has lost 35 lbs (13% of TBW) since starting therapy plan. Due to success, plan to increase dose.  INCREASE dose of Zepbound to 5mg subcutaneous once weekly  Educate on side effects of dose increase  Reinforce patient that to maximize weight loss and prevent return weight gain, patient has to combine GLP-1 with good diet and exercise  DASH Diet  150 mins per week of exercise  FUV in 4 weeks regarding side effects and dose titration  Continue to focus on lifestyle modifications as discussed.    Patient Education:  Counseled patient on relevant medication mechanisms of action, expectations, side effects, duration of therapy, contraindications, administration, and monitoring parameters.  All questions and concerns addressed. Contact pharmacist with any further questions or concerns prior to next appointment.  Reviewed dietary recommendations: Healthy Plate method    Clinical Pharmacist follow-up: 3/7, Telehealth visit    Maxime Archuleta PharmD  Clinical Pharmacist  02/07/25    Continue all meds under the continuation of care with the referring provider and clinical pharmacy team.  Verbal consent to manage patient's drug therapy was obtained from the patient. They were informed they may decline to participate or withdraw from participation in pharmacy services at any time.  "

## 2025-02-20 ASSESSMENT — ENCOUNTER SYMPTOMS
BLURRED VISION: 0
PALPITATIONS: 0
SWEATS: 0
HEADACHES: 0
ORTHOPNEA: 0
PND: 0
HYPERTENSION: 1
NECK PAIN: 0
SHORTNESS OF BREATH: 0

## 2025-02-21 ENCOUNTER — APPOINTMENT (OUTPATIENT)
Facility: CLINIC | Age: 40
End: 2025-02-21
Payer: COMMERCIAL

## 2025-02-21 VITALS
OXYGEN SATURATION: 97 % | TEMPERATURE: 97.5 F | SYSTOLIC BLOOD PRESSURE: 110 MMHG | WEIGHT: 235 LBS | HEIGHT: 70 IN | HEART RATE: 67 BPM | RESPIRATION RATE: 18 BRPM | BODY MASS INDEX: 33.64 KG/M2 | DIASTOLIC BLOOD PRESSURE: 75 MMHG

## 2025-02-21 DIAGNOSIS — G47.33 OBSTRUCTIVE SLEEP APNEA SYNDROME, SEVERE: ICD-10-CM

## 2025-02-21 DIAGNOSIS — E66.09 CLASS 1 OBESITY DUE TO EXCESS CALORIES WITHOUT SERIOUS COMORBIDITY WITH BODY MASS INDEX (BMI) OF 33.0 TO 33.9 IN ADULT: Primary | ICD-10-CM

## 2025-02-21 DIAGNOSIS — E66.811 CLASS 1 OBESITY DUE TO EXCESS CALORIES WITHOUT SERIOUS COMORBIDITY WITH BODY MASS INDEX (BMI) OF 33.0 TO 33.9 IN ADULT: Primary | ICD-10-CM

## 2025-02-21 DIAGNOSIS — Z86.59 HISTORY OF OCD (OBSESSIVE COMPULSIVE DISORDER): ICD-10-CM

## 2025-02-21 DIAGNOSIS — I10 HYPERTENSION, ESSENTIAL, BENIGN: ICD-10-CM

## 2025-02-21 PROCEDURE — 99214 OFFICE O/P EST MOD 30 MIN: CPT | Performed by: FAMILY MEDICINE

## 2025-02-21 PROCEDURE — 3074F SYST BP LT 130 MM HG: CPT | Performed by: FAMILY MEDICINE

## 2025-02-21 PROCEDURE — 3078F DIAST BP <80 MM HG: CPT | Performed by: FAMILY MEDICINE

## 2025-02-21 PROCEDURE — 3008F BODY MASS INDEX DOCD: CPT | Performed by: FAMILY MEDICINE

## 2025-02-21 PROCEDURE — 1036F TOBACCO NON-USER: CPT | Performed by: FAMILY MEDICINE

## 2025-02-21 ASSESSMENT — ENCOUNTER SYMPTOMS
ORTHOPNEA: 0
HEADACHES: 0
HYPERTENSION: 1
SHORTNESS OF BREATH: 0
BLURRED VISION: 0
SWEATS: 0
PALPITATIONS: 0
PND: 0
NECK PAIN: 0

## 2025-02-21 NOTE — ASSESSMENT & PLAN NOTE
Pt on zepbound 5 mg weekly.  He sees clinical pharmacy.  Doing very well.  Losing weight.  Lost approximately 30lbs over last 4-6 weeks.    Lipids are due.  Pt aware  Try to exercise more.     Orders:    Follow Up In Primary Care - Established; Future

## 2025-02-21 NOTE — PROGRESS NOTES
"Subjective     Erasto Bhagat is a 39 y.o. male who presents for Hypertension.    Hypertension  This is a new problem. The current episode started more than 1 month ago. The problem has been rapidly improving since onset. The problem is controlled. Pertinent negatives include no anxiety, blurred vision, chest pain, headaches, malaise/fatigue, neck pain, orthopnea, palpitations, peripheral edema, PND, shortness of breath or sweats. There are no associated agents to hypertension. Risk factors for coronary artery disease include obesity. Compliance problems include exercise.       Pt is here for HTN follow up.     He is on losartan 50 mg daily.  He noticed that once he lost weight his blood pressures are in much better range in the 110-120s systolic.      He also sees  clinical pharmacy for weight loss management.  He is on zepbound 5 mg and is tolerating it well.  No side effects to his zepbound.  He lost approximately 30 lbs over the last six weeks intentionally.  He is eating much less and much healthier.  He does admit that he needs to start exercising more.      His goal weight in the next 3-6 months is < 200 lbs.      Pt reports fevers, mild cough - overall feeling better.  Declines testing for flu or covid.    Review of Systems   Constitutional:  Negative for malaise/fatigue.   Eyes:  Negative for blurred vision.   Respiratory:  Negative for shortness of breath.    Cardiovascular:  Negative for chest pain, palpitations, orthopnea and PND.   Musculoskeletal:  Negative for neck pain.   Neurological:  Negative for headaches.       Objective     Vitals:    02/21/25 1105   BP: 110/75   BP Location: Right arm   Patient Position: Sitting   Pulse: 67   Resp: 18   Temp: 36.4 °C (97.5 °F)   TempSrc: Temporal   SpO2: 97%   Weight: 107 kg (235 lb)   Height: 1.778 m (5' 10\")        Current Outpatient Medications   Medication Instructions    FLUoxetine (PROZAC) 40 mg, oral, Daily    losartan (COZAAR) 50 mg, oral, Daily "    Zepbound 5 mg, subcutaneous, Every 7 days        No Known Allergies     Past Surgical History:   Procedure Laterality Date    APPENDECTOMY      WISDOM TOOTH EXTRACTION          Social History     Tobacco Use    Smoking status: Never    Smokeless tobacco: Never   Vaping Use    Vaping status: Never Used   Substance Use Topics    Alcohol use: Yes     Alcohol/week: 2.0 standard drinks of alcohol     Types: 2 Standard drinks or equivalent per week    Drug use: Never        Family History   Problem Relation Name Age of Onset    No Known Problems Father      Crohn's disease Sister      Hypothyroidism Maternal Grandmother Cheryl     Hypertension Maternal Grandmother Cheryl     Breast cancer Maternal Grandmother Cheryl         Immunization History   Administered Date(s) Administered    COVID-19, mRNA, LNP-S, PF, 30 mcg/0.3 mL dose 11/26/2021    Flu vaccine (IIV4), preservative free *Check age/dose* 09/11/2022    Tdap vaccine, age 7 year and older (BOOSTRIX, ADACEL) 01/03/2025        Physical Exam  Vitals reviewed.   Constitutional:       General: He is not in acute distress.     Appearance: Normal appearance. He is well-developed. He is obese.   HENT:      Head: Normocephalic and atraumatic.   Eyes:      General: Lids are normal.      Conjunctiva/sclera:      Right eye: Right conjunctiva is not injected.      Left eye: Left conjunctiva is not injected.   Cardiovascular:      Rate and Rhythm: Normal rate and regular rhythm.      Heart sounds: No murmur heard.  Pulmonary:      Effort: Pulmonary effort is normal. No respiratory distress.      Breath sounds: Normal breath sounds. No wheezing, rhonchi or rales.   Skin:     General: Skin is warm and dry.      Findings: No rash.   Neurological:      Mental Status: He is alert and oriented to person, place, and time. Mental status is at baseline.   Psychiatric:         Mood and Affect: Mood normal.         Behavior: Behavior normal.         Assessment & Plan  Class 1 obesity due to  excess calories without serious comorbidity with body mass index (BMI) of 33.0 to 33.9 in adult  Pt on zepbound 5 mg weekly.  He sees clinical pharmacy.  Doing very well.  Losing weight.  Lost approximately 30lbs over last 4-6 weeks.    Lipids are due.  Pt aware  Try to exercise more.     Orders:    Follow Up In Primary Care - Established; Future    Hypertension, essential, benign  Controlled   Orders:    Follow Up In Primary Care - Established; Future    Obstructive sleep apnea syndrome, severe  Pt uses cpap nightly , sleeping well.        History of OCD (obsessive compulsive disorder)  Prozac 40 mg  - controlled

## 2025-03-07 ENCOUNTER — APPOINTMENT (OUTPATIENT)
Dept: PHARMACY | Facility: HOSPITAL | Age: 40
End: 2025-03-07
Payer: COMMERCIAL

## 2025-03-07 DIAGNOSIS — E66.09 CLASS 2 OBESITY DUE TO EXCESS CALORIES WITHOUT SERIOUS COMORBIDITY WITH BODY MASS INDEX (BMI) OF 38.0 TO 38.9 IN ADULT: Primary | ICD-10-CM

## 2025-03-07 DIAGNOSIS — E66.812 CLASS 2 OBESITY DUE TO EXCESS CALORIES WITHOUT SERIOUS COMORBIDITY WITH BODY MASS INDEX (BMI) OF 38.0 TO 38.9 IN ADULT: Primary | ICD-10-CM

## 2025-03-07 NOTE — PROGRESS NOTES
[facility] Pharmacist Visit - Weight Management  Erasto Bhagat is a 39 y.o. male was referred to Clinical Pharmacy Team for Obesity.    Referring Provider: Dejuan Bishop DO  PCP: Dejuan Bishop DO - last visit: 1/14/25, next visit:     Subjective   HPI  PMH significant for HTN.  Special needs/barriers to therapy: none    WEIGHT MANAGEMENT  BMI Readings from Last 1 Encounters:   02/21/25 33.72 kg/m²   Starting weight: 268 lbs (122kg)  Current weight: 219 lbs    Lab Results   Component Value Date    HGBA1C 5.6 01/03/2025    GLUCOSE 93 02/06/2025   Hyperglycemia: Denies signs and symptoms    Denies any side effects with dose increase.    Non-pharmacological therapy  Weight loss techniques attempted:  Commercial weight loss program: slim fast    Pharmacological therapy  Current Medications: Zepbound 5mg  Previous Medications: none     Adherence: Takes medication as directed and reports no missed doses  Adverse Effects: none    Insurance coverage of weight-loss medications? No,    Eligible for copay cards/programs? Yes,      Medication Patient Risks/Cautions Notes   Wegovy (semaglutide) None    Zepbound (tirzepatide) None    Saxenda (liraglutide) None Current backorder of starting doses   Qsymia (phentermine-topiramate) None Controlled substance   Contrave (bupropion-naltrexone) None    Adipex (phentermine) None Controlled substance,  Short-term use only   Samy/Xenical (orlistat)  Adverse effects likely outweigh benefit.         Secondary Prevention  ASCVD Risk:   The ASCVD Risk score (Chris DK, et al., 2019) failed to calculate for the following reasons:    The 2019 ASCVD risk score is only valid for ages 40 to 79    Objective   Vitals  BP Readings from Last 2 Encounters:   02/21/25 110/75   01/03/25 (!) 156/96     BMI Readings from Last 1 Encounters:   02/21/25 33.72 kg/m²      Labs  A1C  Lab Results   Component Value Date    HGBA1C 5.6 01/03/2025     BMP  Lab Results   Component Value Date    CALCIUM 9.8  "02/06/2025     02/06/2025    K 4.4 02/06/2025    CO2 29 02/06/2025     02/06/2025    BUN 12 02/06/2025    CREATININE 1.18 02/06/2025    EGFR 80 02/06/2025     LFTs  Lab Results   Component Value Date    ALT 45 02/06/2025    AST 29 02/06/2025    ALKPHOS 53 02/06/2025    BILITOT 0.4 02/06/2025     FLP  No results found for: \"TRIG\", \"CHOL\", \"LDLF\", \"LDLCALC\", \"HDL\"    Assessment/Plan   Problem List Items Addressed This Visit    None      Weight Management: Current regimen includes Zepbound 5mg subcutaneous. Patient's current weight reported as 219 lbs. Has lost 49 lbs (18.3% of TBW) since starting therapy plan. Due to success, plan to increase dose.  INCREASE dose of Zepbound to 7.5mg subcutaneous once weekly  Educate on side effects of dose increase  Reinforce patient that to maximize weight loss and prevent return weight gain, patient has to combine GLP-1 with good diet and exercise  DASH Diet  150 mins per week of exercise  FUV in 4 weeks regarding side effects and dose titration  Continue to focus on lifestyle modifications as discussed.    Patient Education:  Counseled patient on relevant medication mechanisms of action, expectations, side effects, duration of therapy, contraindications, administration, and monitoring parameters.  All questions and concerns addressed. Contact pharmacist with any further questions or concerns prior to next appointment.  Reviewed dietary recommendations: Healthy Plate method    Clinical Pharmacist follow-up: 4/4, Telehealth visit    Maxime Archuleta PharmD  Clinical Pharmacist  03/07/25    Continue all meds under the continuation of care with the referring provider and clinical pharmacy team.  Verbal consent to manage patient's drug therapy was obtained from the patient. They were informed they may decline to participate or withdraw from participation in pharmacy services at any time.  "

## 2025-04-04 ENCOUNTER — APPOINTMENT (OUTPATIENT)
Dept: PHARMACY | Facility: HOSPITAL | Age: 40
End: 2025-04-04
Payer: COMMERCIAL

## 2025-04-04 DIAGNOSIS — E66.812 CLASS 2 OBESITY DUE TO EXCESS CALORIES WITHOUT SERIOUS COMORBIDITY WITH BODY MASS INDEX (BMI) OF 38.0 TO 38.9 IN ADULT: Primary | ICD-10-CM

## 2025-04-04 DIAGNOSIS — E66.09 CLASS 2 OBESITY DUE TO EXCESS CALORIES WITHOUT SERIOUS COMORBIDITY WITH BODY MASS INDEX (BMI) OF 38.0 TO 38.9 IN ADULT: Primary | ICD-10-CM

## 2025-04-04 NOTE — PROGRESS NOTES
[facility] Pharmacist Visit - Weight Management  Erasto Bhagat is a 39 y.o. male was referred to Clinical Pharmacy Team for Obesity.    Referring Provider: Dejuan Bishop DO  PCP: Dejuan Bishop DO - last visit: 1/14/25, next visit: -    Subjective   HPI  PMH significant for HTN.  Special needs/barriers to therapy: none    WEIGHT MANAGEMENT  BMI Readings from Last 1 Encounters:   02/21/25 33.72 kg/m²   Starting weight: 268 lbs (122 kg)  Current weight: 208 lbs     Lab Results   Component Value Date    HGBA1C 5.6 01/03/2025    GLUCOSE 93 02/06/2025   Hyperglycemia: Denies signs and symptoms    Denies    Non-pharmacological therapy  Weight loss techniques attempted:  Commercial weight loss program: slim fast     Pharmacological therapy  Current Medications: Zepbound 7.5mg  Previous Medications: none      Adherence: Takes medication as directed and reports no missed doses  Adverse Effects: none     Insurance coverage of weight-loss medications? No,    Eligible for copay cards/programs? Yes,      Medication Patient Risks/Cautions Notes   Wegovy (semaglutide) None    Zepbound (tirzepatide) None    Saxenda (liraglutide) None Current backorder of starting doses   Qsymia (phentermine-topiramate) None Controlled substance   Contrave (bupropion-naltrexone) None    Adipex (phentermine) None Controlled substance,  Short-term use only   Samy/Xenical (orlistat)  Adverse effects likely outweigh benefit.         Secondary Prevention  ASCVD Risk:   The ASCVD Risk score (Chris DK, et al., 2019) failed to calculate for the following reasons:    The 2019 ASCVD risk score is only valid for ages 40 to 79      Objective   Vitals  BP Readings from Last 2 Encounters:   02/21/25 110/75   01/03/25 (!) 156/96     BMI Readings from Last 1 Encounters:   02/21/25 33.72 kg/m²      Labs  A1C  Lab Results   Component Value Date    HGBA1C 5.6 01/03/2025     BMP  Lab Results   Component Value Date    CALCIUM 9.8 02/06/2025     02/06/2025     "K 4.4 02/06/2025    CO2 29 02/06/2025     02/06/2025    BUN 12 02/06/2025    CREATININE 1.18 02/06/2025    EGFR 80 02/06/2025     LFTs  Lab Results   Component Value Date    ALT 45 02/06/2025    AST 29 02/06/2025    ALKPHOS 53 02/06/2025    BILITOT 0.4 02/06/2025     FLP  No results found for: \"TRIG\", \"CHOL\", \"LDLF\", \"LDLCALC\", \"HDL\"    Assessment/Plan   Problem List Items Addressed This Visit    None      Weight Management: Current regimen includes Zepbound 75mg subcutaneous. Patient's current weight reported as 208 lbs. Has lost 60 lbs (22% of TBW) since starting therapy plan. Due to success, plan to increase dose.  INCREASE dose of Zepbound to 10mg subcutaneous once weekly  Educate on side effects of dose increase  Reinforce patient that to maximize weight loss and prevent return weight gain, patient has to combine GLP-1 with good diet and exercise  DASH Diet  150 mins per week of exercise  FUV in 4 weeks regarding side effects and dose titration  Continue to focus on lifestyle modifications as discussed.    Patient Education:  Counseled patient on relevant medication mechanisms of action, expectations, side effects, duration of therapy, contraindications, administration, and monitoring parameters.  All questions and concerns addressed. Contact pharmacist with any further questions or concerns prior to next appointment.  Reviewed dietary recommendations: Healthy Plate method    Clinical Pharmacist follow-up: 4/30, Telehealth visit    Maxime Archuleta PharmD  Clinical Pharmacist  04/04/25    Continue all meds under the continuation of care with the referring provider and clinical pharmacy team.  Verbal consent to manage patient's drug therapy was obtained from the patient. They were informed they may decline to participate or withdraw from participation in pharmacy services at any time.  "

## 2025-04-30 ENCOUNTER — APPOINTMENT (OUTPATIENT)
Dept: PHARMACY | Facility: HOSPITAL | Age: 40
End: 2025-04-30
Payer: COMMERCIAL

## 2025-04-30 DIAGNOSIS — G47.33 OBSTRUCTIVE SLEEP APNEA SYNDROME, SEVERE: ICD-10-CM

## 2025-04-30 DIAGNOSIS — E66.812 CLASS 2 OBESITY DUE TO EXCESS CALORIES WITHOUT SERIOUS COMORBIDITY WITH BODY MASS INDEX (BMI) OF 38.0 TO 38.9 IN ADULT: Primary | ICD-10-CM

## 2025-04-30 DIAGNOSIS — E66.09 CLASS 2 OBESITY DUE TO EXCESS CALORIES WITHOUT SERIOUS COMORBIDITY WITH BODY MASS INDEX (BMI) OF 38.0 TO 38.9 IN ADULT: Primary | ICD-10-CM

## 2025-04-30 NOTE — PROGRESS NOTES
[facility] Pharmacist Visit - Weight Management  Erasto Bhagat is a 39 y.o. male was referred to Clinical Pharmacy Team for Obesity.    Referring Provider: Dejuan Bishop DO  PCP: Dejuan Bishop DO - last visit: 1/14/25, next visit: -    Subjective   HPI  PMH significant for HTN.  Special needs/barriers to therapy: none    WEIGHT MANAGEMENT  BMI Readings from Last 1 Encounters:   02/21/25 33.72 kg/m²   Starting weight: 268 lbs (122 kg)  Current weight: 196 lbs    Lab Results   Component Value Date    HGBA1C 5.6 01/03/2025    GLUCOSE 93 02/06/2025   Hyperglycemia: Denies signs and symptoms    Denies any side effects with dose increased    Non-pharmacological therapy  Weight loss techniques attempted:  Commercial weight loss program: slim fast     Pharmacological therapy  Current Medications: Zepbound 7.5mg  Previous Medications: none      Adherence: Takes medication as directed and reports no missed doses  Adverse Effects: none     Insurance coverage of weight-loss medications? No,    Eligible for copay cards/programs? Yes,      Medication Patient Risks/Cautions Notes   Wegovy (semaglutide) None    Zepbound (tirzepatide) None    Saxenda (liraglutide) None Current backorder of starting doses   Qsymia (phentermine-topiramate) None Controlled substance   Contrave (bupropion-naltrexone) None    Adipex (phentermine) None Controlled substance,  Short-term use only   Samy/Xenical (orlistat)  Adverse effects likely outweigh benefit.         Secondary Prevention  ASCVD Risk:   The ASCVD Risk score (Chris DK, et al., 2019) failed to calculate for the following reasons:    The 2019 ASCVD risk score is only valid for ages 40 to 79    Objective   Vitals  BP Readings from Last 2 Encounters:   02/21/25 110/75   01/03/25 (!) 156/96     BMI Readings from Last 1 Encounters:   02/21/25 33.72 kg/m²      Labs  A1C  Lab Results   Component Value Date    HGBA1C 5.6 01/03/2025     BMP  Lab Results   Component Value Date    CALCIUM 9.8  "02/06/2025     02/06/2025    K 4.4 02/06/2025    CO2 29 02/06/2025     02/06/2025    BUN 12 02/06/2025    CREATININE 1.18 02/06/2025    EGFR 80 02/06/2025     LFTs  Lab Results   Component Value Date    ALT 45 02/06/2025    AST 29 02/06/2025    ALKPHOS 53 02/06/2025    BILITOT 0.4 02/06/2025     FLP  No results found for: \"TRIG\", \"CHOL\", \"LDLF\", \"LDLCALC\", \"HDL\"    Assessment/Plan   Problem List Items Addressed This Visit       Obstructive sleep apnea syndrome, severe    Relevant Orders    Referral to Clinical Pharmacy     Other Visit Diagnoses         Class 2 obesity due to excess calories without serious comorbidity with body mass index (BMI) of 38.0 to 38.9 in adult    -  Primary    Relevant Orders    Referral to Clinical Pharmacy            Weight Management:  Current regimen includes Zepbound 10mg subcutaneous. Patient's current weight reported as 196 lbs. Has lost 72 lbs (27% of TBW) since starting therapy plan. Due to success, plan to continue.  Continue taking Zepbound 10mg subcutaneous once weekly  Continue to focus on lifestyle modifications as discussed.    Patient Education:  Counseled patient on relevant medication mechanisms of action, expectations, side effects, duration of therapy, contraindications, administration, and monitoring parameters.  All questions and concerns addressed. Contact pharmacist with any further questions or concerns prior to next appointment.  Reviewed dietary recommendations: Healthy Plate method    Clinical Pharmacist follow-up: 6/11, Telehealth visit    Maxime Archuleta PharmD  Clinical Pharmacist  04/30/25    Continue all meds under the continuation of care with the referring provider and clinical pharmacy team.  Verbal consent to manage patient's drug therapy was obtained from the patient. They were informed they may decline to participate or withdraw from participation in pharmacy services at any time.  "

## 2025-05-12 ENCOUNTER — APPOINTMENT (OUTPATIENT)
Dept: SLEEP MEDICINE | Facility: CLINIC | Age: 40
End: 2025-05-12
Payer: COMMERCIAL

## 2025-05-12 DIAGNOSIS — E66.3 OVERWEIGHT: ICD-10-CM

## 2025-05-12 DIAGNOSIS — G47.33 OSA (OBSTRUCTIVE SLEEP APNEA): Primary | ICD-10-CM

## 2025-05-12 PROCEDURE — 1036F TOBACCO NON-USER: CPT | Performed by: PSYCHIATRY & NEUROLOGY

## 2025-05-12 PROCEDURE — 99212 OFFICE O/P EST SF 10 MIN: CPT | Performed by: PSYCHIATRY & NEUROLOGY

## 2025-05-12 RX ORDER — OMEPRAZOLE 20 MG/1
20 TABLET, DELAYED RELEASE ORAL
COMMUNITY

## 2025-05-12 NOTE — PROGRESS NOTES
Patient: Erasto Bhagat    [id number]  : 1985 -- AGE 39 y.o.    Provider: Ricki Mejia MD     [facility]   Service Date: 2025              [facility]  Follow-up Note      Virtual or Telephone Consent  An interactive audio and video telecommunication system which permits real time communications between the patient (at the originating site) and provider (at the distant site) was utilized to provide this telehealth service.   Verbal consent was requested and obtained from Erasto Bhagat on this date, 25 for a telehealth visit and the patient's location was confirmed at the time of the visit.  I verified the patient's identity and physical location in [place].  If this is a new patient to me, I informed the patient of my name and type of active [place] license that I hold.      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 Per record review, he initially presented to his PCP on 2023 with concern for GABRIELLE due to loud snoring, gasping/choking at night, apneic spells, and daytime sleepiness. He is here today for annual follow up visit. He was last seen by me on 24.    Going well using CPAP. No issues sleeping with it.     Lost a lot of weight, has been on Zepbound since 2025. Went from ~260 lbs to 195 lbs now. Paying out of pocket directly from [employer], costing him ~$500/month.    No longer needing antihypertensive medication.    Had snoring even when he was thinner. Happy to continue using CPAP.    With CPAP, no known snoring, no dry mouth, no daytime sleepiness. No pressure intolerance.    Sleeping 7-8 hours/night.    PAP DEVICE   DME: [facility]  Setup date: 23  Type: AirSense 11 autoCPAP    Patient is keeping the equipment clean and supplies are being renewed at appropriate intervals.     Prior Sleep studies:   HST 2023: Weight 108.9 kg, BMI  34.44.  Study showed very severe GABRIELLE with AHI3% 95/h with no positional component.  Respiratory events consisted of 605 obstructive apneas, 3 central apneas, and 53 hypopneas.  Baseline SpO2 93.4%, mean 89.6%, romeo 48.4% with 133.8 minutes spent at or below 88% (32% of testing time).               REVIEW OF MACHINE DOWNLOAD:   Date Range: 25-25  % Days used: 100%  % Days with Usage >= 4 hrs: 100%  Average usage days used: 8 h 40 min   Settin-18 cmH2O, EPR 3  95th percentile pressure: 11.2 cmH2O, median pressure 7.8 cmH2O, max pressure 12.5 cmH2O  95th percentile leak: 6 LPM, median leak 0 LPM  Residual AHI: 3.9  HI: 0.2  OAI: 3.2  YOLANDA: 0.5  UI: 0.0    Last 365 days:    Problem List[1]  Medical History[2]  Surgical History[3]  Medications Ordered Prior to Encounter[4]    PHYSICAL EXAMINATION:   General: Awake. Alert. Comfortable. No apparent distress.   Speech: Normal.  Comprehension: Normal.  Mood: Stable.  Affect: Appropriate.  Pul:         Normal respiratory effort.   Abd:        Increased central adiposity  Neuro: Alert, well-oriented. Cranial nerves II-XII grossly normal and symmetric.  No abnormal movements noted.       ASSESSMENT AND PLAN: Mr. Erasto Bhagat is a 39 y.o. male with history of severe GABRIELLE treating with GABRIELLE. Patient's sleep apnea is under very good control with CPAP, he is deriving significant subjective benefit from treatment, his compliance is excellent, and mask leak is well controlled overall. He has lost a considerable amount of weight in the last several months since being on tirzepatide (went from ~260 lbs to now 195 lbs). His CPAP pressure requirement is lower, but suggests that he still has GABRIELLE. We discussed continuing CPAP, as he has significant snoring even when he was thinner in years past.      #GABRIELLE  -continue current CPAP settings; continue nightly usage    #overweight - significantly improving with tirzepatide. BMI is now ~28  -tirzepatide managed through  PCP    All of the above was discussed with the patient in detail. He voiced an understanding of the above and was agreeable to proceed further as advised.     18 minutes were spent with the patient plus time spent reviewing the chart, updating the chart as needed, and documenting.     FOLLOW UP: May 11, 2026 at 8 AM via video for annual CPAP follow up         [1]   Patient Active Problem List  Diagnosis    History of OCD (obsessive compulsive disorder)    Class 1 obesity due to excess calories without serious comorbidity with body mass index (BMI) of 33.0 to 33.9 in adult    Obstructive sleep apnea syndrome, severe    Hypertension, essential, benign   [2] History reviewed. No pertinent past medical history.  [3]   Past Surgical History:  Procedure Laterality Date    APPENDECTOMY      WISDOM TOOTH EXTRACTION     [4]   Current Outpatient Medications on File Prior to Visit   Medication Sig Dispense Refill    FLUoxetine (PROzac) 40 mg capsule Take 1 capsule (40 mg) by mouth once daily. 90 capsule 3    tirzepatide, weight loss, (Zepbound) 10 mg/0.5 mL injection Inject 10 mg under the skin every 7 days. 2 mL 3    losartan (Cozaar) 50 mg tablet Take 1 tablet (50 mg) by mouth once daily. (Patient not taking: Reported on 5/12/2025) 90 tablet 1    omeprazole OTC (PriLOSEC OTC) 20 mg EC tablet Take 1 tablet (20 mg) by mouth once daily in the morning. Take before meals. Do not crush, chew, or split.       No current facility-administered medications on file prior to visit.

## 2025-05-23 ENCOUNTER — APPOINTMENT (OUTPATIENT)
Facility: CLINIC | Age: 40
End: 2025-05-23
Payer: COMMERCIAL

## 2025-05-23 VITALS
HEIGHT: 70 IN | RESPIRATION RATE: 18 BRPM | BODY MASS INDEX: 27.63 KG/M2 | TEMPERATURE: 97.6 F | WEIGHT: 193 LBS | OXYGEN SATURATION: 95 % | DIASTOLIC BLOOD PRESSURE: 85 MMHG | HEART RATE: 74 BPM | SYSTOLIC BLOOD PRESSURE: 118 MMHG

## 2025-05-23 DIAGNOSIS — Z86.39 HISTORY OF OBESITY: ICD-10-CM

## 2025-05-23 DIAGNOSIS — L91.8 SKIN TAG: ICD-10-CM

## 2025-05-23 DIAGNOSIS — Z86.59 HISTORY OF OCD (OBSESSIVE COMPULSIVE DISORDER): ICD-10-CM

## 2025-05-23 DIAGNOSIS — D22.30 NEVUS OF FACE: ICD-10-CM

## 2025-05-23 DIAGNOSIS — G47.33 OSA ON CPAP: ICD-10-CM

## 2025-05-23 DIAGNOSIS — Z86.79 HISTORY OF HYPERTENSION: Primary | ICD-10-CM

## 2025-05-23 PROCEDURE — 3079F DIAST BP 80-89 MM HG: CPT | Performed by: FAMILY MEDICINE

## 2025-05-23 PROCEDURE — 3008F BODY MASS INDEX DOCD: CPT | Performed by: FAMILY MEDICINE

## 2025-05-23 PROCEDURE — 99213 OFFICE O/P EST LOW 20 MIN: CPT | Performed by: FAMILY MEDICINE

## 2025-05-23 PROCEDURE — 3074F SYST BP LT 130 MM HG: CPT | Performed by: FAMILY MEDICINE

## 2025-05-23 PROCEDURE — 1036F TOBACCO NON-USER: CPT | Performed by: FAMILY MEDICINE

## 2025-05-23 ASSESSMENT — ENCOUNTER SYMPTOMS
HEADACHES: 0
SHORTNESS OF BREATH: 0
HYPERTENSION: 1

## 2025-05-23 ASSESSMENT — PATIENT HEALTH QUESTIONNAIRE - PHQ9
1. LITTLE INTEREST OR PLEASURE IN DOING THINGS: NOT AT ALL
SUM OF ALL RESPONSES TO PHQ9 QUESTIONS 1 AND 2: 0
2. FEELING DOWN, DEPRESSED OR HOPELESS: NOT AT ALL

## 2025-05-23 NOTE — ASSESSMENT & PLAN NOTE
On zepbound 10 mg weekly, doing very well, losing weight.  Sees  clinical pharmacy    ** please complete fasting lipid panel

## 2025-05-23 NOTE — PROGRESS NOTES
"Subjective     Erasto Bhagat is a 39 y.o. male who presents for Hypertension.    Hypertension  Associated symptoms include anxiety. Pertinent negatives include no chest pain, headaches or shortness of breath.        Pt is here today for hypertension follow up.  He stopped taking losartan approximately two months ago due to lower blood pressure readings.      He sees  clinical pharmacy for weight loss management.  He is on zepbound 10 mg and is tolerating it well.  No side effects to his zepbound.  He has been doing very well with weight loss.  He has lost approximately 42 lbs since his last visit with me on 2/21/25.      Heartburn controlled well on daily PPI.        Review of Systems   Respiratory:  Negative for shortness of breath.    Cardiovascular:  Negative for chest pain.   Neurological:  Negative for headaches.       Objective     Vitals:    05/23/25 1051   BP: 118/85   BP Location: Left arm   Patient Position: Sitting   Pulse: 74   Resp: 18   Temp: 36.4 °C (97.6 °F)   TempSrc: Temporal   SpO2: 95%   Weight: 87.5 kg (193 lb)   Height: 1.778 m (5' 10\")        Current Outpatient Medications   Medication Instructions    FLUoxetine (PROZAC) 40 mg, oral, Daily    omeprazole OTC (PRILOSEC OTC) 20 mg, Daily before breakfast    tirzepatide (weight loss) (ZEPBOUND) 10 mg, subcutaneous, Every 7 days        RX Allergies[1]     Surgical History[2]     Social History[3]     Family History[4]     Immunization History   Administered Date(s) Administered    COVID-19, mRNA, LNP-S, PF, 30 mcg/0.3 mL dose 11/26/2021    Flu vaccine (IIV4), preservative free *Check age/dose* 09/11/2022    Tdap vaccine, age 7 year and older (BOOSTRIX, ADACEL) 01/03/2025        Lab Results   Component Value Date    WBC 7.2 01/03/2025    RBC 4.76 01/03/2025    HGB 13.5 01/03/2025    HCT 40.7 (L) 01/03/2025    MCV 86 01/03/2025    MCH 28.4 01/03/2025    MCHC 33.2 01/03/2025     01/03/2025     Lab Results   Component Value Date    " "GLUCOSE 93 02/06/2025     02/06/2025    K 4.4 02/06/2025     02/06/2025    CO2 29 02/06/2025    ANIONGAP 14 02/06/2025    BUN 12 02/06/2025    CREATININE 1.18 02/06/2025    CALCIUM 9.8 02/06/2025    ALBUMIN 4.8 02/06/2025    ALKPHOS 53 02/06/2025    PROT 7.6 02/06/2025    AST 29 02/06/2025    BILITOT 0.4 02/06/2025    ALT 45 02/06/2025      No results found for: \"CHOL\", \"HDL\", \"CHHDL\", \"LDLCALC\", \"VLDL\", \"TRIG\"   Lab Results   Component Value Date    TSH 2.33 01/03/2025      No results found for: \"VITD25\"   Lab Results   Component Value Date    HGBA1C 5.6 01/03/2025    CNWQSTHX3O 114 01/03/2025       Physical Exam  Vitals reviewed.   Constitutional:       General: He is not in acute distress.     Appearance: Normal appearance. He is well-developed.   HENT:      Head: Normocephalic and atraumatic.   Eyes:      General: Lids are normal.      Conjunctiva/sclera:      Right eye: Right conjunctiva is not injected.      Left eye: Left conjunctiva is not injected.   Cardiovascular:      Rate and Rhythm: Normal rate and regular rhythm.      Heart sounds: No murmur heard.  Pulmonary:      Effort: Pulmonary effort is normal. No respiratory distress.      Breath sounds: Normal breath sounds. No wheezing, rhonchi or rales.   Skin:     General: Skin is warm and dry.      Findings: No rash.   Neurological:      Mental Status: He is alert and oriented to person, place, and time. Mental status is at baseline.   Psychiatric:         Mood and Affect: Mood normal.         Behavior: Behavior normal.         Assessment & Plan  History of hypertension  No longer on losartan due to low BP readings - stopped in march 2025.         History of OCD (obsessive compulsive disorder)  On prozac.        GABRIELLE on CPAP  Using nightly, doing well, sees pulmonologist.         History of obesity  On zepbound 10 mg weekly, doing very well, losing weight.  Sees  clinical pharmacy    ** please complete fasting lipid panel        Skin " tag  Right axilla   Orders:    Referral to Dermatology    Nevus of face  Right upper temple/forehead  Orders:    Referral to Dermatology                        [1] No Known Allergies  [2]   Past Surgical History:  Procedure Laterality Date    APPENDECTOMY      WISDOM TOOTH EXTRACTION     [3]   Social History  Tobacco Use    Smoking status: Never    Smokeless tobacco: Never   Vaping Use    Vaping status: Never Used   Substance Use Topics    Alcohol use: Yes     Alcohol/week: 2.0 standard drinks of alcohol     Types: 2 Standard drinks or equivalent per week    Drug use: Never   [4]   Family History  Problem Relation Name Age of Onset    No Known Problems Father      Crohn's disease Sister      Hypothyroidism Maternal Grandmother Cheryl     Hypertension Maternal Grandmother Cheryl     Breast cancer Maternal Grandmother Cheryl

## 2025-05-24 LAB
CHOLEST SERPL-MCNC: 180 MG/DL
CHOLEST/HDLC SERPL: 5.1 (CALC)
HDLC SERPL-MCNC: 35 MG/DL
LDLC SERPL CALC-MCNC: 124 MG/DL (CALC)
NONHDLC SERPL-MCNC: 145 MG/DL (CALC)
TRIGL SERPL-MCNC: 100 MG/DL

## 2025-05-26 DIAGNOSIS — E78.5 DYSLIPIDEMIA (HIGH LDL; LOW HDL): Primary | ICD-10-CM

## 2025-06-11 ENCOUNTER — APPOINTMENT (OUTPATIENT)
Dept: PHARMACY | Facility: HOSPITAL | Age: 40
End: 2025-06-11
Payer: COMMERCIAL

## 2025-06-11 DIAGNOSIS — E66.812 CLASS 2 OBESITY DUE TO EXCESS CALORIES WITHOUT SERIOUS COMORBIDITY WITH BODY MASS INDEX (BMI) OF 38.0 TO 38.9 IN ADULT: ICD-10-CM

## 2025-06-11 DIAGNOSIS — G47.33 OBSTRUCTIVE SLEEP APNEA SYNDROME, SEVERE: ICD-10-CM

## 2025-06-11 DIAGNOSIS — E66.09 CLASS 2 OBESITY DUE TO EXCESS CALORIES WITHOUT SERIOUS COMORBIDITY WITH BODY MASS INDEX (BMI) OF 38.0 TO 38.9 IN ADULT: ICD-10-CM

## 2025-06-11 NOTE — PROGRESS NOTES
[facility] Pharmacist Visit - Weight Management  Erasto Bhagat is a 39 y.o. male was referred to Clinical Pharmacy Team for Obesity.    Referring Provider: Dejuan Bishop DO  PCP: Dejuan Bishop DO - last visit: 5/23/25, next visit: -    Subjective   HPI  PMH significant for HTN.  Special needs/barriers to therapy: none    WEIGHT MANAGEMENT  BMI Readings from Last 1 Encounters:   05/23/25 27.69 kg/m²   Starting weight: 268 lbs (122 kg)  Current weight: 187 lbs    Lab Results   Component Value Date    HGBA1C 5.6 01/03/2025    GLUCOSE 93 02/06/2025   Hyperglycemia: Denies signs and symptoms    Denies any side effects with dose increased     Non-pharmacological therapy  Weight loss techniques attempted:  Commercial weight loss program: slim fast     Pharmacological therapy  Current Medications: Zepbound 7.5mg  Previous Medications: none      Adherence: Takes medication as directed and reports no missed doses  Adverse Effects: none     Insurance coverage of weight-loss medications? No,    Eligible for copay cards/programs? Yes,      Medication Patient Risks/Cautions Notes   Wegovy (semaglutide) No known concerns    Zepbound (tirzepatide) No known concerns    Saxenda (liraglutide) No known concerns Current backorder of starting doses   Qsymia (phentermine-topiramate) No known concerns Controlled substance   Contrave (bupropion-naltrexone) No known concerns    Adipex (phentermine) No known concerns Controlled substance,  Short-term use only   Samy/Xenical (orlistat)  Adverse effects likely outweigh benefit.         Secondary Prevention  ASCVD Risk:   The ASCVD Risk score (Chris DK, et al., 2019) failed to calculate for the following reasons:    The 2019 ASCVD risk score is only valid for ages 40 to 79      Objective   Vitals  BP Readings from Last 2 Encounters:   05/23/25 118/85   02/21/25 110/75     BMI Readings from Last 1 Encounters:   05/23/25 27.69 kg/m²      Labs  A1C  Lab Results   Component Value Date     HGBA1C 5.6 01/03/2025     BMP  Lab Results   Component Value Date    CALCIUM 9.8 02/06/2025     02/06/2025    K 4.4 02/06/2025    CO2 29 02/06/2025     02/06/2025    BUN 12 02/06/2025    CREATININE 1.18 02/06/2025    EGFR 80 02/06/2025     LFTs  Lab Results   Component Value Date    ALT 45 02/06/2025    AST 29 02/06/2025    ALKPHOS 53 02/06/2025    BILITOT 0.4 02/06/2025     FLP  Lab Results   Component Value Date    TRIG 100 05/23/2025    CHOL 180 05/23/2025    LDLCALC 124 (H) 05/23/2025    HDL 35 (L) 05/23/2025       Assessment/Plan   Problem List Items Addressed This Visit    None  Visit Diagnoses         Class 2 obesity due to excess calories without serious comorbidity with body mass index (BMI) of 38.0 to 38.9 in adult        Relevant Medications    tirzepatide, weight loss, (Zepbound) 10 mg/0.5 mL injection    Other Relevant Orders    Referral to Clinical Pharmacy            Weight Management: Current regimen includes Zepbound 10mg subcutaneous once weekly. Patient's current weight reported as 187 lbs. Has lost 81 lbs (30% of TBW) since starting therapy plan. Due to success, plan to continue.  Continue taking Zepbound 10mg subcutaneous once weekly. Pt wants to stay at current dose for the forseeable future  Continue to focus on lifestyle modifications as discussed.    Patient Education:  Counseled patient on relevant medication mechanisms of action, expectations, side effects, duration of therapy, contraindications, administration, and monitoring parameters.  All questions and concerns addressed. Contact pharmacist with any further questions or concerns prior to next appointment.  Reviewed dietary recommendations: Healthy Plate method    Clinical Pharmacist follow-up: 11/12, Telehealth visit    Maxime Archuleta PharmD  Clinical Pharmacist  06/11/25    Continue all meds under the continuation of care with the referring provider and clinical pharmacy team.  Verbal consent to manage patient's drug therapy  was obtained from the patient. They were informed they may decline to participate or withdraw from participation in pharmacy services at any time.

## 2025-08-05 DIAGNOSIS — E66.812 CLASS 2 OBESITY DUE TO EXCESS CALORIES WITHOUT SERIOUS COMORBIDITY WITH BODY MASS INDEX (BMI) OF 38.0 TO 38.9 IN ADULT: ICD-10-CM

## 2025-08-05 DIAGNOSIS — G47.33 OBSTRUCTIVE SLEEP APNEA SYNDROME, SEVERE: ICD-10-CM

## 2025-08-05 DIAGNOSIS — E66.09 CLASS 2 OBESITY DUE TO EXCESS CALORIES WITHOUT SERIOUS COMORBIDITY WITH BODY MASS INDEX (BMI) OF 38.0 TO 38.9 IN ADULT: ICD-10-CM

## 2025-08-05 DIAGNOSIS — E66.09 CLASS 2 OBESITY DUE TO EXCESS CALORIES WITHOUT SERIOUS COMORBIDITY WITH BODY MASS INDEX (BMI) OF 38.0 TO 38.9 IN ADULT: Primary | ICD-10-CM

## 2025-08-05 DIAGNOSIS — E66.812 CLASS 2 OBESITY DUE TO EXCESS CALORIES WITHOUT SERIOUS COMORBIDITY WITH BODY MASS INDEX (BMI) OF 38.0 TO 38.9 IN ADULT: Primary | ICD-10-CM

## 2025-08-05 RX ORDER — TIRZEPATIDE 10 MG/.5ML
10 INJECTION, SOLUTION SUBCUTANEOUS
Qty: 2 ML | Refills: 11 | Status: SHIPPED | OUTPATIENT
Start: 2025-08-05

## 2025-08-05 RX ORDER — TIRZEPATIDE 10 MG/.5ML
INJECTION, SOLUTION SUBCUTANEOUS
Qty: 2 ML | OUTPATIENT
Start: 2025-08-05

## 2025-08-26 DIAGNOSIS — E78.5 DYSLIPIDEMIA (HIGH LDL; LOW HDL): ICD-10-CM

## 2025-11-12 ENCOUNTER — APPOINTMENT (OUTPATIENT)
Dept: PHARMACY | Facility: HOSPITAL | Age: 40
End: 2025-11-12
Payer: COMMERCIAL

## 2026-01-06 ENCOUNTER — APPOINTMENT (OUTPATIENT)
Facility: CLINIC | Age: 41
End: 2026-01-06
Payer: COMMERCIAL

## 2026-05-11 ENCOUNTER — APPOINTMENT (OUTPATIENT)
Dept: SLEEP MEDICINE | Facility: CLINIC | Age: 41
End: 2026-05-11
Payer: COMMERCIAL